# Patient Record
Sex: FEMALE | URBAN - METROPOLITAN AREA
[De-identification: names, ages, dates, MRNs, and addresses within clinical notes are randomized per-mention and may not be internally consistent; named-entity substitution may affect disease eponyms.]

---

## 2022-05-11 ENCOUNTER — OFFICE VISIT (OUTPATIENT)
Dept: URBAN - METROPOLITAN AREA CLINIC 6 | Facility: CLINIC | Age: 57
End: 2022-05-11

## 2022-05-11 DIAGNOSIS — H40.42X3 GLAUCOMA SECONDARY TO EYE INFLAM, LEFT EYE, SEVERE STAGE: ICD-10-CM

## 2022-05-11 DIAGNOSIS — E11.3553 DIABETES MELLITUS TYPE 2 WITH STABLE PROLIFERATIVE RETINOPATHY, BILATERAL: Primary | ICD-10-CM

## 2022-05-11 DIAGNOSIS — H25.813 COMBINED FORMS OF AGE-RELATED CATARACT, BILATERAL: ICD-10-CM

## 2022-05-11 PROCEDURE — 99204 OFFICE O/P NEW MOD 45 MIN: CPT | Performed by: OPTOMETRIST

## 2022-05-11 PROCEDURE — 92250 FUNDUS PHOTOGRAPHY W/I&R: CPT | Performed by: OPTOMETRIST

## 2022-05-11 RX ORDER — DORZOLAMIDE HYDROCHLORIDE AND TIMOLOL MALEATE 20; 5 MG/ML; MG/ML
SOLUTION/ DROPS OPHTHALMIC
Qty: 10 | Refills: 6 | Status: ACTIVE
Start: 2022-05-11

## 2022-05-11 RX ORDER — BRIMONIDINE TARTRATE 2 MG/ML
0.2 % SOLUTION/ DROPS OPHTHALMIC
Qty: 15 | Refills: 6 | Status: ACTIVE
Start: 2022-05-11

## 2022-05-11 ASSESSMENT — INTRAOCULAR PRESSURE
OS: 75
OD: 11

## 2022-05-11 NOTE — IMPRESSION/PLAN
Impression: Diabetes mellitus Type 2 with stable proliferative retinopathy, bilateral: F10.8402. Plan: Diabetes Mellitus Type II with Proliferative Diabetic Retinopathy both eyes with absence of  macular edema -advised to sleep with head elevation. Discussed the pathophysiology of diabetes and its effect on the eye. Stressed the importance of strong glucose control. Advised of importance of scheduled dilated examinations, and to contact us immediately for any problems or concerns. To Dr. Dinora Bloom for possible injection OD.

## 2022-05-11 NOTE — IMPRESSION/PLAN
Impression: Combined forms of age-related cataract, bilateral: H25.813. Plan: Moderate cataract both eyes : Appears to be visually significant but hold off on cataract extraction for now.

## 2022-05-11 NOTE — IMPRESSION/PLAN
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 Plan: pt reported ocular pain. Start cosopt TID OS, brimonidine TID OS. Vyzulta QHS OS-sample given. 2 Diamox 250mg PO given in office and 1 gtt Combigan instilled in office. Referred to Dr. Carolyn Chandler.

## 2022-06-01 ENCOUNTER — OFFICE VISIT (OUTPATIENT)
Dept: URBAN - METROPOLITAN AREA CLINIC 6 | Facility: CLINIC | Age: 57
End: 2022-06-01

## 2022-06-01 DIAGNOSIS — H40.42X3 GLAUCOMA SECONDARY TO EYE INFLAM, LEFT EYE, SEVERE STAGE: Primary | ICD-10-CM

## 2022-06-01 DIAGNOSIS — H25.043 POSTERIOR SUBCAPSULAR POLAR AGE-RELATED CATARACT, BILATERAL: ICD-10-CM

## 2022-06-01 DIAGNOSIS — E11.3523 DIABETES MELLITUS TYPE 2 WITH PROLIFERATIVE DIABETIC RETINOPATHY WITH TRACTION RETINAL DETACHMENT INVOLVING THE MACULA, BILATERAL: ICD-10-CM

## 2022-06-01 PROCEDURE — 99204 OFFICE O/P NEW MOD 45 MIN: CPT | Performed by: OPHTHALMOLOGY

## 2022-06-01 PROCEDURE — 92134 CPTRZ OPH DX IMG PST SGM RTA: CPT | Performed by: OPHTHALMOLOGY

## 2022-06-01 ASSESSMENT — INTRAOCULAR PRESSURE
OS: 66
OD: 17

## 2022-06-01 NOTE — IMPRESSION/PLAN
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 Plan: Severe NVG OS with NLP vision. COmfortable. Continue gtts. Will need CPC/ possible tube with Dr. Vargas Born once gets the [facility] coverage.

## 2022-06-01 NOTE — IMPRESSION/PLAN
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 Plan: pt reported ocular pain. Start cosopt TID OS, brimonidine TID OS. Vyzulta QHS OS-sample given. 2 Diamox 250mg PO given in office and 1 gtt Combigan instilled in office. Referred to Dr. Magdiel Valle.

## 2022-06-01 NOTE — IMPRESSION/PLAN
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 Plan: Diabetes Mellitus Type II with Proliferative Diabetic Retinopathy and macula involving tractional detachment- Severe ischemia OS>OD. Guarded prognosis explained. Discussed the pathophysiology of diabetes and its effect on the eye. Stressed the importance of strong glucose control. Advised of importance of scheduled dilated examinations, and to contact us immediately for any problems or concerns. Patient was referred to a retina specialist for further evaluation and management.

## 2022-06-01 NOTE — IMPRESSION/PLAN
Impression: Posterior subcapsular polar age-related cataract, bilateral: H25.043. Plan: Defer CE till cleared by [facility].

## 2022-09-07 ENCOUNTER — OFFICE VISIT (OUTPATIENT)
Dept: URBAN - METROPOLITAN AREA CLINIC 6 | Facility: CLINIC | Age: 57
End: 2022-09-07

## 2022-09-07 DIAGNOSIS — H25.043 POSTERIOR SUBCAPSULAR POLAR AGE-RELATED CATARACT, BILATERAL: ICD-10-CM

## 2022-09-07 DIAGNOSIS — E11.3523 DIABETES MELLITUS TYPE 2 WITH PROLIFERATIVE DIABETIC RETINOPATHY WITH TRACTION RETINAL DETACHMENT INVOLVING THE MACULA, BILATERAL: Primary | ICD-10-CM

## 2022-09-07 DIAGNOSIS — H40.42X3 GLAUCOMA SECONDARY TO EYE INFLAM, LEFT EYE, SEVERE STAGE: ICD-10-CM

## 2022-09-07 PROCEDURE — 92014 COMPRE OPH EXAM EST PT 1/>: CPT | Performed by: OPTOMETRIST

## 2022-09-07 RX ORDER — ACETAZOLAMIDE 250 MG/1
250 MG TABLET ORAL
Qty: 14 | Refills: 0 | Status: ACTIVE
Start: 2022-09-07

## 2022-09-07 RX ORDER — ACETAZOLAMIDE 250 MG/1
250 MG TABLET ORAL
Qty: 20 | Refills: 0 | Status: INACTIVE
Start: 2022-09-07 | End: 2022-09-07

## 2022-09-07 ASSESSMENT — INTRAOCULAR PRESSURE
OS: 96
OD: 17

## 2022-09-07 NOTE — IMPRESSION/PLAN
Impression: Posterior subcapsular polar age-related cataract, bilateral: H25.043. Plan: Defer CE till cleared by [facility].

## 2022-09-07 NOTE — IMPRESSION/PLAN
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 Plan: Diabetes Mellitus Type II with Proliferative Diabetic Retinopathy and macula involving tractional detachment- Severe ischemia OS>OD. Guarded prognosis explained. Discussed the pathophysiology of diabetes and its effect on the eye. Stressed the importance of strong glucose control. Advised of importance of scheduled dilated examinations, and to contact us immediately for any problems or concerns. Patient was referred to a retina specialist for further evaluation and management.

## 2022-09-07 NOTE — IMPRESSION/PLAN
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 Plan: Severe NVG OS IOP today 96 with NLP vision. Comfortable. Continue gtts. Will need CPC/ possible tube with Dr. Mohan Baltazar once gets the [facility] coverage. Gave samples of Diamox 250 mg two tablets QD PO  and Combigan BID OS.